# Patient Record
(demographics unavailable — no encounter records)

---

## 2024-12-05 NOTE — REASON FOR VISIT
[[employer] ] : provided by [employer]   [Interpreters_IDNumber] : [id number] [FreeTextEntry3] : janessa

## 2024-12-05 NOTE — PAST MEDICAL HISTORY
[At Term] : at term [Normal Vaginal Route] : by normal vaginal route [None] : there were no delivery complications [de-identified] : 2.6 kg

## 2024-12-05 NOTE — PHYSICAL EXAM
[Healthy Appearing] : healthy appearing [Well Nourished] : well nourished [Interactive] : interactive [Normal Appearance] : normal appearance [Well formed] : well formed [Normally Set] : normally set [Enlarged Diffusely] : was diffusely enlarged [___] : a single ~M [unfilled] ~Ucm nodule palpated on the right lobe of the thyroid [Normal S1 and S2] : normal S1 and S2 [Murmur] : no murmurs [Clear to Ausculation Bilaterally] : clear to auscultation bilaterally [Abdomen Soft] : soft [Abdomen Tenderness] : non-tender [] : no hepatosplenomegaly [Normal] : normal  [de-identified] : Soft, slightly enlarged thyroid gland, soft somewhat indistinctly palpable fullness right lobe, no lymphadenopathy